# Patient Record
Sex: FEMALE | URBAN - METROPOLITAN AREA
[De-identification: names, ages, dates, MRNs, and addresses within clinical notes are randomized per-mention and may not be internally consistent; named-entity substitution may affect disease eponyms.]

---
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
## 2024-02-24 NOTE — PROCEDURE: HIGH RISK MEDICATION MONITORING
Imaging Studies Oxybutynin Pregnancy And Lactation Text: This medication is Pregnancy Category B and is considered safe during pregnancy. It is unknown if it is excreted in breast milk.

## 2024-03-11 NOTE — PROCEDURE: HIGH RISK MEDICATION MONITORING
ANNA/Riya Doxepin Pregnancy And Lactation Text: This medication is Pregnancy Category C and it isn't known if it is safe during pregnancy. It is also excreted in breast milk and breast feeding isn't recommended.

## 2024-04-22 NOTE — PROCEDURE: MEDICATION COUNSELING
Hyperglycemia Fall Dapsone Pregnancy And Lactation Text: This medication is Pregnancy Category C and is not considered safe during pregnancy or breast feeding.

## 2024-05-20 NOTE — PROCEDURE: MEDICATION COUNSELING
Pt dc'd home via private vehicle accompanied by pt's spouse. VSS. IV and tele removed. Pt's belongings sent home w pt. Denies pain. Denies N/V/D. Afebrile.    Libtayo Pregnancy And Lactation Text: This medication is contraindicated in pregnancy and when breast feeding.

## 2024-07-15 NOTE — PROCEDURE: HIGH RISK MEDICATION MONITORING
negative... Hydroquinone Counseling:  Patient advised that medication may result in skin irritation, lightening (hypopigmentation), dryness, and burning.  In the event of skin irritation, the patient was advised to reduce the amount of the drug applied or use it less frequently.  Rarely, spots that are treated with hydroquinone can become darker (pseudoochronosis).  Should this occur, patient instructed to stop medication and call the office. The patient verbalized understanding of the proper use and possible adverse effects of hydroquinone.  All of the patient's questions and concerns were addressed.

## 2024-08-12 NOTE — PROCEDURE: MEDICATION COUNSELING
-offered counseling on contraception options, pt declines stating she is sure she wants OCPs. Has tolerated in the past.   -FDLNMP: Approx 8/22/24. POCT urine HCG is negative in office today       Plan:  Restart Junel, instructed pill must be taken within the same half an hour window daily for maximum efficacy    Griseofulvin Pregnancy And Lactation Text: This medication is Pregnancy Category X and is known to cause serious birth defects. It is unknown if this medication is excreted in breast milk but breast feeding should be avoided.

## 2024-11-27 NOTE — PROCEDURE: HIGH RISK MEDICATION MONITORING
severe Ketoconazole Pregnancy And Lactation Text: This medication is Pregnancy Category C and it isn't know if it is safe during pregnancy. It is also excreted in breast milk and breast feeding isn't recommended.
